# Patient Record
Sex: FEMALE | Race: WHITE | NOT HISPANIC OR LATINO | URBAN - METROPOLITAN AREA
[De-identification: names, ages, dates, MRNs, and addresses within clinical notes are randomized per-mention and may not be internally consistent; named-entity substitution may affect disease eponyms.]

---

## 2019-07-04 ENCOUNTER — EMERGENCY (EMERGENCY)
Facility: HOSPITAL | Age: 49
LOS: 1 days | Discharge: HOME | End: 2019-07-04
Attending: EMERGENCY MEDICINE | Admitting: EMERGENCY MEDICINE
Payer: SELF-PAY

## 2019-07-04 VITALS
OXYGEN SATURATION: 99 % | HEART RATE: 80 BPM | SYSTOLIC BLOOD PRESSURE: 132 MMHG | TEMPERATURE: 98 F | DIASTOLIC BLOOD PRESSURE: 80 MMHG | RESPIRATION RATE: 18 BRPM

## 2019-07-04 VITALS
RESPIRATION RATE: 19 BRPM | SYSTOLIC BLOOD PRESSURE: 118 MMHG | WEIGHT: 154.98 LBS | HEART RATE: 88 BPM | OXYGEN SATURATION: 98 % | DIASTOLIC BLOOD PRESSURE: 68 MMHG | HEIGHT: 68 IN

## 2019-07-04 DIAGNOSIS — Z90.711 ACQUIRED ABSENCE OF UTERUS WITH REMAINING CERVICAL STUMP: Chronic | ICD-10-CM
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
## 2019-07-04 DIAGNOSIS — S80.212A ABRASION, LEFT KNEE, INITIAL ENCOUNTER: ICD-10-CM
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
## 2019-07-04 DIAGNOSIS — Z23 ENCOUNTER FOR IMMUNIZATION: ICD-10-CM

## 2019-07-04 DIAGNOSIS — R04.0 EPISTAXIS: ICD-10-CM

## 2019-07-04 DIAGNOSIS — S01.21XA LACERATION WITHOUT FOREIGN BODY OF NOSE, INITIAL ENCOUNTER: ICD-10-CM

## 2019-07-04 DIAGNOSIS — Z91.041 RADIOGRAPHIC DYE ALLERGY STATUS: ICD-10-CM

## 2019-07-04 DIAGNOSIS — S80.211A ABRASION, RIGHT KNEE, INITIAL ENCOUNTER: ICD-10-CM
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
## 2019-07-04 PROCEDURE — 99283 EMERGENCY DEPT VISIT LOW MDM: CPT | Mod: 25

## 2019-07-04 PROCEDURE — 12011 RPR F/E/E/N/L/M 2.5 CM/<: CPT

## 2019-07-04 RX ORDER — TETANUS TOXOID, REDUCED DIPHTHERIA TOXOID AND ACELLULAR PERTUSSIS VACCINE, ADSORBED 5; 2.5; 8; 8; 2.5 [IU]/.5ML; [IU]/.5ML; UG/.5ML; UG/.5ML; UG/.5ML
0.5 SUSPENSION INTRAMUSCULAR ONCE
Refills: 0 | Status: COMPLETED | OUTPATIENT
Start: 2019-07-04 | End: 2019-07-04

## 2019-07-04 RX ORDER — IBUPROFEN 200 MG
1 TABLET ORAL
Qty: 28 | Refills: 0
Start: 2019-07-04 | End: 2019-07-10

## 2019-07-04 RX ORDER — OXYCODONE AND ACETAMINOPHEN 5; 325 MG/1; MG/1
1 TABLET ORAL ONCE
Refills: 0 | Status: DISCONTINUED | OUTPATIENT
Start: 2019-07-04 | End: 2019-07-04

## 2019-07-04 RX ADMIN — OXYCODONE AND ACETAMINOPHEN 1 TABLET(S): 5; 325 TABLET ORAL at 20:17

## 2019-07-04 RX ADMIN — TETANUS TOXOID, REDUCED DIPHTHERIA TOXOID AND ACELLULAR PERTUSSIS VACCINE, ADSORBED 0.5 MILLILITER(S): 5; 2.5; 8; 8; 2.5 SUSPENSION INTRAMUSCULAR at 20:16

## 2019-07-04 RX ADMIN — OXYCODONE AND ACETAMINOPHEN 1 TABLET(S): 5; 325 TABLET ORAL at 21:33

## 2019-07-04 RX ADMIN — Medication 1 TABLET(S): at 21:33

## 2019-07-04 NOTE — ED PROVIDER NOTE - SECONDARY DIAGNOSIS.
Abrasion Need for tetanus, diphtheria, and acellular pertussis (Tdap) vaccine Epistaxis due to trauma Nose injury, initial encounter Fall, initial encounter

## 2019-07-04 NOTE — ED PROVIDER NOTE - CARE PROVIDER_API CALL
Eloy Braden)  Otolaryngology  [address] [place] [address], [address] [place] [address]  Phone: [phone]  Fax: [phone]  Follow Up Time: 4-6 Days

## 2019-07-04 NOTE — ED PROVIDER NOTE - NS ED ROS FT
Review of Systems  Constitutional:  No fever, chills.  Eyes:  No visual changes, eye pain, or discharge.  ENMT:  No hearing changes, pain, or discharge. No nasal congestion, discharge. No throat pain, swelling, or difficulty swallowing. (+) epistaxis  Cardiac:  No chest pain, palpitations, syncope.  Respiratory:  No dyspnea, cough. No hemoptysis.  GI:  No nausea, vomiting, diarrhea, or abdominal pain.   MS:  No back pain.  Skin:  No skin rash, pruritis, jaundice, or lesions. (+) facial laceration  Neuro:  No headache, dizziness, loss of sensation, or focal weakness.  No change in mental status.   Endocrine: No history of thyroid disease or diabetes.

## 2019-07-04 NOTE — ED PROVIDER NOTE - CARE PLAN
Principal Discharge DX:	Laceration of nose, initial encounter  Secondary Diagnosis:	Nose injury, initial encounter  Secondary Diagnosis:	Fall, initial encounter  Secondary Diagnosis:	Abrasion  Secondary Diagnosis:	Epistaxis due to trauma  Secondary Diagnosis:	Need for tetanus, diphtheria, and acellular pertussis (Tdap) vaccine

## 2019-07-04 NOTE — ED PROVIDER NOTE - NSFOLLOWUPINSTRUCTIONS_ED_ALL_ED_FT
PLEASE RETURN TO ED IN 5 DAYS FOR SUTURE REMOVAL.     Laceration    A laceration is a cut that goes through all of the layers of the skin and into the tissue that is right under the skin. Some lacerations heal on their own. Others need to be closed with skin adhesive strips, skin glue, stitches (sutures), or staples. Proper laceration care minimizes the risk of infection and helps the laceration to heal better.  If non-absorbable stitches or staples have been placed, they must be taken out within the time frame instructed by your healthcare provider.    SEEK IMMEDIATE MEDICAL CARE IF YOU HAVE ANY OF THE FOLLOWING SYMPTOMS: swelling around the wound, worsening pain, drainage from the wound, red streaking going away from your wound, inability to move finger or toe near the laceration, or discoloration of skin near the laceration.     Epistaxis    Epistaxis is the medical term for a nosebleed. Nosebleeds are common and can be caused by many conditions, such as injury, infections, dry environments, medicines, nose picking, and home heating and cooling systems. Try controlling your nosebleed by pinching your nose continuously for at least 10 minutes. Avoid lying down while you are having a nosebleed. Sit up and lean forward. Avoid blowing or sniffing your nose for a number of hours after having a nosebleed. Resume your normal activities as you are able, but avoid straining, lifting, or bending at the waist for several days. Maintain humidity in your home by using less air conditioning or by using a humidifier.     If your nose was packed by your health care provider, keep the packing inside of your nose until a health care provider removes it. If a balloon catheter was used to pack your nose, do not cut or remove it unless your health care provider has instructed you to do that.     Aspirin and blood thinners make bleeding more likely. If you are prescribed these medicines and you suffer from nosebleeds, ask your health care provider if you should stop taking the medicines or adjust the dose. Do not stop medicines unless directed by your health care provider.    SEEK IMMEDIATE MEDICAL CARE IF YOU HAVE ANY OF THE FOLLOWING SYMPTOMS: nosebleed lasting longer than 20 minutes, unusual bleeding from or bruising on other parts of your body, dizziness or lightheadedness, fainting, nosebleed occurring after a head injury, or fever.

## 2019-07-04 NOTE — ED PROVIDER NOTE - PHYSICAL EXAMINATION
VITAL SIGNS: I have reviewed nursing notes and confirm.  CONSTITUTIONAL: Well-developed; well-nourished; in no acute distress.  SKIN: Skin exam is warm and dry, no acute rash.  HEAD: Normocephalic; No scalp TTP or step-offs.   EYES: PERRL, EOM intact; conjunctiva and sclera clear. No periorbital ecchymosis.   ENT: No nasal discharge; airway clear. (+) dried blood in nares. (+) 2 cm laceration to bridge of nose with mild active bleeding and swelling.   NECK: Supple; non tender.  CARD: S1, S2 normal; no murmurs, gallops, or rubs. Regular rate and rhythm.  RESP: No wheezes, rales or rhonchi. Speaking in full sentences.   ABD: Normal bowel sounds; soft; non-distended; non-tender; No rebound or guarding.  BACK: No midline or paraspinal TTP.   EXT: Normal ROM. No clubbing, cyanosis or edema. (+) superficial abrasions to B/L knees. No knee TTP of deformity, FROM. Pelvis stable.   NEURO: Alert, oriented. Grossly unremarkable. No focal deficits.

## 2019-07-04 NOTE — ED ADULT NURSE NOTE - NSIMPLEMENTINTERV_GEN_ALL_ED
Implemented All Universal Safety Interventions:  [place] to call system. Call bell, personal items and telephone within reach. Instruct patient to call for assistance. Room bathroom lighting operational. Non-slip footwear when patient is off stretcher. Physically safe environment: no spills, clutter or unnecessary equipment. Stretcher in lowest position, wheels locked, appropriate side rails in place.

## 2019-07-04 NOTE — ED ADULT TRIAGE NOTE - CHIEF COMPLAINT QUOTE
As per patient about a half an hour ago I was running and tripped falling right on my face cutting my face". Pt presents with abrasions to elbows and legs, no ac. Pt hx of thrombophilia denies LOC

## 2019-07-04 NOTE — ED PROVIDER NOTE - OBJECTIVE STATEMENT
50 yo F with no significant PMHx presents to the ED s/p mechanical trip and fall c/o epistaxis and laceration to nose. Pt was running, tripped and fell onto her face. Pt applied dressing to nose and came to ED for evaluation. Pt is not on anticoagulation. Pt unsure of tetanus status. She denies other complaints. Pt denies fever, chills, nausea, vomiting, abdominal pain, diarrhea, headache, dizziness, weakness, chest pain, SOB, back pain, LOC.

## 2019-07-04 NOTE — ED PROVIDER NOTE - CLINICAL SUMMARY MEDICAL DECISION MAKING FREE TEXT BOX
I personally evaluated the patient. I reviewed the Resident’s or Physician Assistant’s note (as assigned above), and agree with the findings and plan except as documented in my note. Laceration repaired, patient feels well, I have fully discussed the medical management and delivery of care with the patient. I have discussed any available labs, imaging and treatment options with the patient. Patient confirms understanding and has been given detailed return precautions. Patient instructed to return to the ED should symptoms persist or worsen. Patient has demonstrated capacity and has verbalized understanding. Patient is well appearing upon discharge.

## 2019-07-09 ENCOUNTER — EMERGENCY (EMERGENCY)
Facility: HOSPITAL | Age: 49
LOS: 0 days | Discharge: HOME | End: 2019-07-09
Attending: EMERGENCY MEDICINE | Admitting: EMERGENCY MEDICINE

## 2019-07-09 VITALS
OXYGEN SATURATION: 100 % | RESPIRATION RATE: 18 BRPM | SYSTOLIC BLOOD PRESSURE: 134 MMHG | TEMPERATURE: 97 F | DIASTOLIC BLOOD PRESSURE: 78 MMHG | HEART RATE: 92 BPM
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
## 2019-07-09 DIAGNOSIS — Z91.041 RADIOGRAPHIC DYE ALLERGY STATUS: ICD-10-CM

## 2019-07-09 DIAGNOSIS — Z79.2 LONG TERM (CURRENT) USE OF ANTIBIOTICS: ICD-10-CM

## 2019-07-09 DIAGNOSIS — Z90.711 ACQUIRED ABSENCE OF UTERUS WITH REMAINING CERVICAL STUMP: Chronic | ICD-10-CM

## 2019-07-09 DIAGNOSIS — Z79.1 LONG TERM (CURRENT) USE OF NON-STEROIDAL ANTI-INFLAMMATORIES (NSAID): ICD-10-CM

## 2019-07-09 DIAGNOSIS — S01.21XD LACERATION WITHOUT FOREIGN BODY OF NOSE, SUBSEQUENT ENCOUNTER: ICD-10-CM

## 2019-07-09 NOTE — ED PROVIDER NOTE - CLINICAL SUMMARY MEDICAL DECISION MAKING FREE TEXT BOX
Well healed laceration. Sutures removed. No other pain or symptoms. Mild residual bruising.    Full DC instructions discussed and patient knows when to seek immediate medical attention.  Patient has proper follow up.

## 2019-07-09 NOTE — ED PROVIDER NOTE - NSFOLLOWUPINSTRUCTIONS_ED_ALL_ED_FT
Apply sunblock to wound when exposed to sun.  Suture/Staple Removal    After having your stitches or staples removed it is typical to have minor discomfort, swelling, or redness in the area. The wound is still healing so continue to protect it from injury. Keep the wound dry and if given creams, ointments, or medication, take as instructed to by your health care professional.    SEEK IMMEDIATE MEDICAL CARE IF YOU HAVE ANY OF THE FOLLOWING SYMPTOMS: increasing redness/swelling/pain in the wound, pus coming from the wound, bad smell coming from the wound, or fever.

## 2019-07-09 NOTE — ED PROVIDER NOTE - OBJECTIVE STATEMENT
50 yo female with PMH of thrombophilia presents to the ED for suture removal from nose that were placed in the ED on July 4th after trip and fall. Patient received tdap. Patient is asymptomatic at this time. Patient denies fever, chills, SOB, other injuries, headache, or rash.

## 2019-07-09 NOTE — ED PROVIDER NOTE - NS ED ROS FT
Constitutional: (-) fever (-) chills  Eyes: (-) visual changes   Respiratory: (-) SOB   GI: (-) nausea (-) vomiting (-) diarrhea   Neuro: (-) headache   Skin: (+) suture removal (-) rash  Except as documented in the HPI, all other systems are negative.

## 2019-07-09 NOTE — ED PROVIDER NOTE - ATTENDING CONTRIBUTION TO CARE
I personally evaluated patient. I agree with the findings and plan with all documentation on chart except as documented  in my note.    Well healed laceration. Sutures removed. No other pain or symptoms. Mild residual bruising.    Full DC instructions discussed and patient knows when to seek immediate medical attention.  Patient has proper follow up.

## 2019-07-09 NOTE — ED PROVIDER NOTE - PHYSICAL EXAMINATION
GENERAL: Well-nourished, Well-developed. NAD.  CVS: Normal S1,S2. No murmurs appreciated on auscultation   RESP: No use of accessory muscles. Chest rise symmetrical with good expansion. Lungs clear to auscultation B/L. No wheezing, rales, or rhonchi auscultated.  MSK: FROM of upper and lower extremities B/L.   Skin: + 4 sutures placed to 2 cm laceration to nose.  No surrounding erythema or signs of infection. Well healing. No active drainage.  EXT: Radial pulses present B/L.  Neuro: AA&O x 3. CNs II-XII grossly intact. Speaking in full sentences. No slurring of speech. No facial droop. No tremors. Sensation grossly intact. Strength 5/5 B/L. Gait within normal limits.  Psych:  Cooperative. Calm

## 2023-03-22 NOTE — ED ADULT NURSE NOTE - CHIEF COMPLAINT QUOTE
pt states she fell on 7/4 and received stitches came today for suture removal [Normal] : supple, no neck mass and thyroid not enlarged [Normal Supraclavicular Lymph Nodes] : normal supraclavicular lymph nodes [Normal Axillary Lymph Nodes] : normal axillary lymph nodes [Normal] : oriented to person, place and time, with appropriate affect [FreeTextEntry1] : AB present during exam  [de-identified] : Normal S1, S2. Regular rate and rhythm. [de-identified] : Complete breast exam performed in supine and upright positions. No palpable masses, tenderness, nipple discharge, inversion, deviation or enlarged axillary or supraclavicular lymph nodes.  [de-identified] : Clear breath sounds bilaterally, normal respiratory effort.

## 2024-01-11 NOTE — ED ADULT NURSE NOTE - NSSUSCREENINGQ2_ED_ALL_ED
No
PAST MEDICAL HISTORY:  GERD (gastroesophageal reflux disease)     HLD (hyperlipidemia)     Hypertension     Lupus